# Patient Record
Sex: MALE | Race: WHITE | NOT HISPANIC OR LATINO | Employment: FULL TIME | URBAN - METROPOLITAN AREA
[De-identification: names, ages, dates, MRNs, and addresses within clinical notes are randomized per-mention and may not be internally consistent; named-entity substitution may affect disease eponyms.]

---

## 2021-12-06 ENCOUNTER — OFFICE VISIT (OUTPATIENT)
Dept: ORTHOPEDIC SURGERY | Facility: CLINIC | Age: 46
End: 2021-12-06

## 2021-12-06 VITALS
SYSTOLIC BLOOD PRESSURE: 140 MMHG | BODY MASS INDEX: 27.62 KG/M2 | DIASTOLIC BLOOD PRESSURE: 82 MMHG | HEART RATE: 70 BPM | WEIGHT: 176 LBS | HEIGHT: 67 IN

## 2021-12-06 DIAGNOSIS — M25.571 RIGHT ANKLE PAIN, UNSPECIFIED CHRONICITY: Primary | ICD-10-CM

## 2021-12-06 PROCEDURE — 99203 OFFICE O/P NEW LOW 30 MIN: CPT | Performed by: ORTHOPAEDIC SURGERY

## 2021-12-06 RX ORDER — FENOFIBRATE 134 MG/1
CAPSULE ORAL DAILY
COMMUNITY
Start: 2021-09-07

## 2021-12-06 RX ORDER — BUPROPION HYDROCHLORIDE 300 MG/1
TABLET ORAL DAILY
COMMUNITY
Start: 2021-11-03

## 2021-12-06 RX ORDER — TADALAFIL 20 MG/1
TABLET ORAL
COMMUNITY
Start: 2021-11-11

## 2021-12-06 RX ORDER — PANTOPRAZOLE SODIUM 40 MG/1
TABLET, DELAYED RELEASE ORAL DAILY
COMMUNITY
Start: 2021-12-05

## 2021-12-06 RX ORDER — LEVOTHYROXINE SODIUM 0.07 MG/1
TABLET ORAL
COMMUNITY
Start: 2021-11-05

## 2021-12-06 RX ORDER — MONTELUKAST SODIUM 10 MG/1
TABLET ORAL DAILY
COMMUNITY
Start: 2021-12-05

## 2021-12-06 RX ORDER — FLUOXETINE HYDROCHLORIDE 20 MG/1
CAPSULE ORAL 2 TIMES DAILY
COMMUNITY
Start: 2021-11-02

## 2021-12-06 NOTE — PROGRESS NOTES
"NEW PATIENT    Patient: Sathya Bowers  : 1975    Primary Care Provider: Cosme Soler MD    Requesting Provider: As above    Right ankle pain      History    Chief Complaint: Right ankle pain    History of Present Illness: This is an extremely pleasant 46-year-old gentleman who had a right tib-fib fracture at the mid third distal third junction in .  It was a soccer injury.  It was treated with casting.  He reports that he remembers being told that it did not heal exactly as the doctor wanted, and that he would probably always have a \"sprained ankle\".  He has worked on his feet for many years.  He is a  at [employer].  Over the past year or so he has had more aching pain and stiffness in the ankle.  He notes some start up pain.  He has given up running.  He has tried good shoes and arch support.  At its worse is 5-6 out of 10.  He takes over-the-counter anti-inflammatory medicine occasionally.    Current Outpatient Medications on File Prior to Visit   Medication Sig Dispense Refill   • buPROPion XL (WELLBUTRIN XL) 300 MG 24 hr tablet Take  by mouth Daily.     • fenofibrate micronized (LOFIBRA) 134 MG capsule Take  by mouth Daily.     • FLUoxetine (PROzac) 20 MG capsule Take  by mouth 2 (Two) Times a Day.     • levothyroxine (SYNTHROID, LEVOTHROID) 75 MCG tablet      • montelukast (SINGULAIR) 10 MG tablet Take  by mouth Daily.     • pantoprazole (PROTONIX) 40 MG EC tablet Take  by mouth Daily.     • tadalafil (CIALIS) 20 MG tablet        No current facility-administered medications on file prior to visit.      No Known Allergies   Past Medical History:   Diagnosis Date   • Ankle sprain 80’s and 90’s    Several times   • Arthritis    • Back problem    • Dislocation of finger 90’s   • Dislocation, shoulder ’s   • Fracture of wrist ’s   • Fracture, fibula     Broke leg   • Fracture, finger     Several times   • Fracture, tibia and fibula     Broke leg   • HTN (hypertension)    • Knee " "swelling 2000’s   • Osteoarthritis    • Tear of meniscus of knee 2000’s    Scope done   • Tennis elbow 2016 to present    On and off from work   • Thyroid disease      Past Surgical History:   Procedure Laterality Date   • CHOLECYSTECTOMY     • FOOT SURGERY  2000’s    Bone removed from pinky toe   • HAND SURGERY  2016    Trigger finger surgery   • TRIGGER POINT INJECTION  2019    Sciatic nerve injections 2 times     Family History   Problem Relation Age of Onset   • Cancer Mother         Passed away from it   • Osteoporosis Mother    • Arthritis Mother    • Heart disease Father    • Hypertension Father    • Heart disease Brother    • Hypertension Brother       Social History     Socioeconomic History   • Marital status:    Tobacco Use   • Smoking status: Never Smoker   • Smokeless tobacco: Never Used   Vaping Use   • Vaping Use: Never used   Substance and Sexual Activity   • Alcohol use: Not Currently   • Drug use: Never   • Sexual activity: Yes     Partners: Female     Birth control/protection: Condom        Review of Systems   Constitutional: Negative.    HENT: Negative.    Eyes: Negative.    Respiratory: Negative.    Cardiovascular: Negative.    Gastrointestinal: Negative.    Endocrine: Negative.    Genitourinary: Negative.    Musculoskeletal: Positive for arthralgias.   Skin: Negative.    Allergic/Immunologic: Negative.    Neurological: Negative.    Hematological: Negative.    Psychiatric/Behavioral: Negative.        The following portions of the patient's history were reviewed and updated as appropriate: allergies, current medications, past family history, past medical history, past social history, past surgical history and problem list.    Physical Exam:   /82   Pulse 70   Ht 170.2 cm (67\")   Wt 79.8 kg (176 lb)   BMI 27.57 kg/m²   GENERAL: Body habitus: normal weight for height    Lower extremity edema: Right: none; Left: none    Varicose veins:  Right: none; Left: none    Gait: normal   "   Mental Status:  awake and alert; oriented to person, place, and time    Voice:  clear  SKIN:  Lower extremity: Normal    Hair Growth(lower extremity):  Right:normal; Left:  normal  NAILS: Toenails: normal  HEENT: Head: Normocephalic, atraumatic,  without obvious abnormality.  eye: normal external eye, no icterus  ears:normal external ears  PULM:  Repiratory effort normal  CV:  Dorsalis Pedis:  Right: 2+; Left:2+    Posterior Tibial: Right:2+; Left:2+    Capillary Refill:  Brisk  MSK:  Hand:sensation intact      Tibia:  Right:  Nontender over the tibia, fracture callus palpable at the mid third distal third junction, looking at the leg clinically it appears to have normal alignment; Left:  non tender      Ankle:  Right: Slightly tender across anterior ankle, range of motion normal and symmetric with the left; Left:  non tender      Foot:  Right:  non tender; Left:  non tender      NEURO: Heel Walking:  Right:  normal; Left:  normal    Toe Walking:  Right:  normal; Left:  normal     [place] 5.07 monofilament test: normal    Lower extremity sensation: intact       Motor Function: all 5/5         Medical Decision Making    Data Review:   ordered and reviewed x-rays today    Assessment and Plan/ Diagnosis/Treatment options:   1. Right ankle pain, unspecified chronicity  I reviewed his x-rays.  I showed him the films.  I explained that unfortunately he has some early ankle arthritis as a result of the fracture healing with mild deformity.  Looking at his leg clinically the deformity is not really apparent, but it healed with mild varus and recurvatum.  I explained that whenever the joint is not in neutral alignment it would tend to develop arthritis over time, similar to a car that is out of alignment wearing out the tires.  I explained there is nothing we can really do at this point.  He should stay as light weight as possible.  He should consider taking glucosamine and condroitin supplements.  And he should  do low impact activity.  Since his job involves being on his feet all day any exercise should be something like rowing machine, swimming, biking.  I will be happy to see him anytime  - XR Ankle 2 View Right            Radiology Ordered []  Radiology Reports Reviewed []      Radiology Images Reviewed []   Labs Reviewed []    Labs Ordered []   PCP Records Reviewed []    Provider Records Reviewed []    ER Records Reviewed []    Hospital Records Reviewed []    History Obtained From Family []    Phone conversation with Provider []    Records Requested []        Erica Corbin MD

## 2024-06-18 ENCOUNTER — TRANSCRIBE ORDERS (OUTPATIENT)
Dept: ADMINISTRATIVE | Facility: HOSPITAL | Age: 49
End: 2024-06-18
Payer: COMMERCIAL

## 2024-06-18 DIAGNOSIS — R11.2 NAUSEA AND VOMITING, UNSPECIFIED VOMITING TYPE: Primary | ICD-10-CM
